# Patient Record
Sex: MALE | Race: WHITE | Employment: UNEMPLOYED | URBAN - METROPOLITAN AREA
[De-identification: names, ages, dates, MRNs, and addresses within clinical notes are randomized per-mention and may not be internally consistent; named-entity substitution may affect disease eponyms.]

---

## 2017-01-16 ENCOUNTER — OFFICE VISIT (OUTPATIENT)
Dept: INTERNAL MEDICINE CLINIC | Age: 27
End: 2017-01-16

## 2017-01-16 VITALS
SYSTOLIC BLOOD PRESSURE: 112 MMHG | HEART RATE: 59 BPM | HEIGHT: 72 IN | WEIGHT: 149.8 LBS | BODY MASS INDEX: 20.29 KG/M2 | OXYGEN SATURATION: 98 % | DIASTOLIC BLOOD PRESSURE: 66 MMHG

## 2017-01-16 DIAGNOSIS — G89.29 CHRONIC RIGHT-SIDED LOW BACK PAIN WITH RIGHT-SIDED SCIATICA: ICD-10-CM

## 2017-01-16 DIAGNOSIS — F17.200 SMOKING ADDICTION: ICD-10-CM

## 2017-01-16 DIAGNOSIS — F79 INTELLECTUAL DISABILITY: ICD-10-CM

## 2017-01-16 DIAGNOSIS — F33.40 RECURRENT MAJOR DEPRESSIVE DISORDER, IN REMISSION (HCC): Primary | Chronic | ICD-10-CM

## 2017-01-16 DIAGNOSIS — M54.41 CHRONIC RIGHT-SIDED LOW BACK PAIN WITH RIGHT-SIDED SCIATICA: ICD-10-CM

## 2017-01-16 DIAGNOSIS — L73.8 FOLLICULITIS BARBAE: ICD-10-CM

## 2017-01-16 PROCEDURE — 99214 OFFICE O/P EST MOD 30 MIN: CPT | Performed by: INTERNAL MEDICINE

## 2017-01-16 RX ORDER — NICOTINE 21 MG/24HR
1 PATCH, TRANSDERMAL 24 HOURS TRANSDERMAL EVERY 24 HOURS
Qty: 30 PATCH | Refills: 3 | Status: SHIPPED | OUTPATIENT
Start: 2017-01-16 | End: 2017-03-16 | Stop reason: DRUGHIGH

## 2017-01-16 ASSESSMENT — ENCOUNTER SYMPTOMS
PHOTOPHOBIA: 0
RESPIRATORY NEGATIVE: 1
GASTROINTESTINAL NEGATIVE: 1
WHEEZING: 0
EYE DISCHARGE: 0
HEMOPTYSIS: 0
HEARTBURN: 0
VOMITING: 0
EYE PAIN: 0
BLURRED VISION: 0
COUGH: 0
SPUTUM PRODUCTION: 0
CONSTIPATION: 0
ABDOMINAL PAIN: 0
NAUSEA: 0
SHORTNESS OF BREATH: 0
SORE THROAT: 0
ORTHOPNEA: 0
BACK PAIN: 0
DOUBLE VISION: 0
EYE REDNESS: 0
STRIDOR: 0
EYES NEGATIVE: 1
DIARRHEA: 0
BLOOD IN STOOL: 0

## 2017-03-16 ENCOUNTER — OFFICE VISIT (OUTPATIENT)
Dept: INTERNAL MEDICINE CLINIC | Age: 27
End: 2017-03-16

## 2017-03-16 VITALS
DIASTOLIC BLOOD PRESSURE: 70 MMHG | OXYGEN SATURATION: 98 % | SYSTOLIC BLOOD PRESSURE: 102 MMHG | BODY MASS INDEX: 20.53 KG/M2 | HEIGHT: 72 IN | WEIGHT: 151.6 LBS | HEART RATE: 72 BPM

## 2017-03-16 DIAGNOSIS — M54.41 CHRONIC RIGHT-SIDED LOW BACK PAIN WITH RIGHT-SIDED SCIATICA: ICD-10-CM

## 2017-03-16 DIAGNOSIS — F17.200 SMOKING ADDICTION: ICD-10-CM

## 2017-03-16 DIAGNOSIS — F79 INTELLECTUAL DISABILITY: ICD-10-CM

## 2017-03-16 DIAGNOSIS — F33.40 RECURRENT MAJOR DEPRESSIVE DISORDER, IN REMISSION (HCC): Primary | Chronic | ICD-10-CM

## 2017-03-16 DIAGNOSIS — L73.8 FOLLICULITIS BARBAE: ICD-10-CM

## 2017-03-16 DIAGNOSIS — M40.50 LORDOSIS: ICD-10-CM

## 2017-03-16 DIAGNOSIS — G89.29 CHRONIC RIGHT-SIDED LOW BACK PAIN WITH RIGHT-SIDED SCIATICA: ICD-10-CM

## 2017-03-16 PROCEDURE — 99214 OFFICE O/P EST MOD 30 MIN: CPT | Performed by: INTERNAL MEDICINE

## 2017-03-16 RX ORDER — NICOTINE 21 MG/24HR
1 PATCH, TRANSDERMAL 24 HOURS TRANSDERMAL EVERY 24 HOURS
Qty: 30 PATCH | Refills: 3 | Status: SHIPPED | OUTPATIENT
Start: 2017-03-16 | End: 2018-09-12 | Stop reason: ALTCHOICE

## 2017-03-16 ASSESSMENT — ENCOUNTER SYMPTOMS
GASTROINTESTINAL NEGATIVE: 1
SHORTNESS OF BREATH: 0
EYE PAIN: 0
NAUSEA: 0
STRIDOR: 0
ABDOMINAL PAIN: 0
PHOTOPHOBIA: 0
BACK PAIN: 0
BLOOD IN STOOL: 0
EYE DISCHARGE: 0
DOUBLE VISION: 0
CONSTIPATION: 0
HEARTBURN: 0
BLURRED VISION: 0
EYES NEGATIVE: 1
EYE REDNESS: 0
HEMOPTYSIS: 0
WHEEZING: 0
RESPIRATORY NEGATIVE: 1
COUGH: 0
SPUTUM PRODUCTION: 0
ORTHOPNEA: 0
DIARRHEA: 0
VOMITING: 0
SORE THROAT: 0

## 2017-06-15 ENCOUNTER — OFFICE VISIT (OUTPATIENT)
Dept: INTERNAL MEDICINE CLINIC | Age: 27
End: 2017-06-15

## 2017-06-15 VITALS
OXYGEN SATURATION: 98 % | HEART RATE: 61 BPM | WEIGHT: 155.4 LBS | SYSTOLIC BLOOD PRESSURE: 102 MMHG | DIASTOLIC BLOOD PRESSURE: 64 MMHG | HEIGHT: 72 IN | BODY MASS INDEX: 21.05 KG/M2

## 2017-06-15 DIAGNOSIS — F79 INTELLECTUAL DISABILITY: ICD-10-CM

## 2017-06-15 DIAGNOSIS — F17.200 SMOKING ADDICTION: ICD-10-CM

## 2017-06-15 DIAGNOSIS — G89.29 CHRONIC RIGHT-SIDED LOW BACK PAIN WITH RIGHT-SIDED SCIATICA: ICD-10-CM

## 2017-06-15 DIAGNOSIS — M54.41 CHRONIC RIGHT-SIDED LOW BACK PAIN WITH RIGHT-SIDED SCIATICA: ICD-10-CM

## 2017-06-15 DIAGNOSIS — F33.40 RECURRENT MAJOR DEPRESSIVE DISORDER, IN REMISSION (HCC): Primary | Chronic | ICD-10-CM

## 2017-06-15 PROCEDURE — 99214 OFFICE O/P EST MOD 30 MIN: CPT | Performed by: INTERNAL MEDICINE

## 2017-06-15 RX ORDER — DICLOFENAC SODIUM 75 MG/1
75 TABLET, DELAYED RELEASE ORAL 2 TIMES DAILY
Qty: 60 TABLET | Refills: 3 | Status: SHIPPED | OUTPATIENT
Start: 2017-06-15 | End: 2018-01-20 | Stop reason: SDUPTHER

## 2017-06-15 RX ORDER — BUSPIRONE HYDROCHLORIDE 5 MG/1
5 TABLET ORAL 3 TIMES DAILY PRN
Qty: 60 TABLET | Refills: 1 | Status: SHIPPED | OUTPATIENT
Start: 2017-06-15 | End: 2017-12-13 | Stop reason: DRUGHIGH

## 2017-06-15 RX ORDER — BUPROPION HYDROCHLORIDE 150 MG/1
150 TABLET ORAL EVERY MORNING
Qty: 30 TABLET | Refills: 3 | Status: SHIPPED | OUTPATIENT
Start: 2017-06-15 | End: 2017-10-16 | Stop reason: SDUPTHER

## 2017-06-15 ASSESSMENT — ENCOUNTER SYMPTOMS
RESPIRATORY NEGATIVE: 1
SHORTNESS OF BREATH: 0
GASTROINTESTINAL NEGATIVE: 1
VOMITING: 0
COUGH: 0
STRIDOR: 0
EYE DISCHARGE: 0
NAUSEA: 0
ORTHOPNEA: 0
HEMOPTYSIS: 0
EYES NEGATIVE: 1
BLURRED VISION: 0
SPUTUM PRODUCTION: 0
ABDOMINAL PAIN: 0
PHOTOPHOBIA: 0
CONSTIPATION: 0
BLOOD IN STOOL: 0
DOUBLE VISION: 0
DIARRHEA: 0
WHEEZING: 0
BACK PAIN: 0
HEARTBURN: 0
EYE REDNESS: 0
EYE PAIN: 0
SORE THROAT: 0

## 2017-09-14 ENCOUNTER — OFFICE VISIT (OUTPATIENT)
Dept: INTERNAL MEDICINE CLINIC | Age: 27
End: 2017-09-14

## 2017-09-14 VITALS
HEIGHT: 72 IN | DIASTOLIC BLOOD PRESSURE: 70 MMHG | SYSTOLIC BLOOD PRESSURE: 110 MMHG | HEART RATE: 72 BPM | OXYGEN SATURATION: 98 % | BODY MASS INDEX: 20.7 KG/M2 | WEIGHT: 152.8 LBS

## 2017-09-14 DIAGNOSIS — M54.41 CHRONIC RIGHT-SIDED LOW BACK PAIN WITH RIGHT-SIDED SCIATICA: ICD-10-CM

## 2017-09-14 DIAGNOSIS — F79 INTELLECTUAL DISABILITY: ICD-10-CM

## 2017-09-14 DIAGNOSIS — L73.8 FOLLICULITIS BARBAE: Primary | ICD-10-CM

## 2017-09-14 DIAGNOSIS — Z23 STREPTOCOCCUS PNEUMONIAE AND INFLUENZA VACCINATION: ICD-10-CM

## 2017-09-14 DIAGNOSIS — F17.200 SMOKING ADDICTION: ICD-10-CM

## 2017-09-14 DIAGNOSIS — F33.40 RECURRENT MAJOR DEPRESSIVE DISORDER, IN REMISSION (HCC): Chronic | ICD-10-CM

## 2017-09-14 DIAGNOSIS — G89.29 CHRONIC RIGHT-SIDED LOW BACK PAIN WITH RIGHT-SIDED SCIATICA: ICD-10-CM

## 2017-09-14 PROCEDURE — 99214 OFFICE O/P EST MOD 30 MIN: CPT | Performed by: INTERNAL MEDICINE

## 2017-09-14 PROCEDURE — 90471 IMMUNIZATION ADMIN: CPT | Performed by: INTERNAL MEDICINE

## 2017-09-14 PROCEDURE — 90732 PPSV23 VACC 2 YRS+ SUBQ/IM: CPT | Performed by: INTERNAL MEDICINE

## 2017-09-14 PROCEDURE — 90686 IIV4 VACC NO PRSV 0.5 ML IM: CPT | Performed by: INTERNAL MEDICINE

## 2017-09-14 PROCEDURE — 90472 IMMUNIZATION ADMIN EACH ADD: CPT | Performed by: INTERNAL MEDICINE

## 2017-09-14 RX ORDER — CLINDAMYCIN AND BENZOYL PEROXIDE 10; 50 MG/G; MG/G
GEL TOPICAL
Qty: 35 G | Refills: 1 | Status: SHIPPED | OUTPATIENT
Start: 2017-09-14 | End: 2018-09-12 | Stop reason: SDUPTHER

## 2017-09-14 ASSESSMENT — ENCOUNTER SYMPTOMS
DOUBLE VISION: 0
SORE THROAT: 0
COUGH: 0
STRIDOR: 0
EYE PAIN: 0
PHOTOPHOBIA: 0
ABDOMINAL PAIN: 0
NAUSEA: 0
HEMOPTYSIS: 0
VOMITING: 0
BLURRED VISION: 0
EYE REDNESS: 0
EYES NEGATIVE: 1
WHEEZING: 0
SHORTNESS OF BREATH: 0
CONSTIPATION: 0
ORTHOPNEA: 0
EYE DISCHARGE: 0
GASTROINTESTINAL NEGATIVE: 1
RESPIRATORY NEGATIVE: 1
BLOOD IN STOOL: 0
SPUTUM PRODUCTION: 0
HEARTBURN: 0
BACK PAIN: 0
DIARRHEA: 0

## 2017-09-14 ASSESSMENT — PATIENT HEALTH QUESTIONNAIRE - PHQ9
2. FEELING DOWN, DEPRESSED OR HOPELESS: 0
1. LITTLE INTEREST OR PLEASURE IN DOING THINGS: 0
SUM OF ALL RESPONSES TO PHQ9 QUESTIONS 1 & 2: 0
SUM OF ALL RESPONSES TO PHQ QUESTIONS 1-9: 0

## 2017-10-16 DIAGNOSIS — F17.200 SMOKING ADDICTION: ICD-10-CM

## 2017-10-16 DIAGNOSIS — F33.40 RECURRENT MAJOR DEPRESSIVE DISORDER, IN REMISSION (HCC): Chronic | ICD-10-CM

## 2017-10-16 RX ORDER — BUPROPION HYDROCHLORIDE 150 MG/1
TABLET ORAL
Qty: 30 TABLET | Refills: 2 | Status: SHIPPED | OUTPATIENT
Start: 2017-10-16 | End: 2018-01-21 | Stop reason: SDUPTHER

## 2017-12-13 ENCOUNTER — OFFICE VISIT (OUTPATIENT)
Dept: INTERNAL MEDICINE CLINIC | Age: 27
End: 2017-12-13

## 2017-12-13 VITALS
TEMPERATURE: 97.7 F | HEIGHT: 72 IN | OXYGEN SATURATION: 98 % | SYSTOLIC BLOOD PRESSURE: 130 MMHG | WEIGHT: 154.4 LBS | DIASTOLIC BLOOD PRESSURE: 80 MMHG | HEART RATE: 85 BPM | RESPIRATION RATE: 16 BRPM | BODY MASS INDEX: 20.91 KG/M2

## 2017-12-13 DIAGNOSIS — L73.8 FOLLICULITIS BARBAE: ICD-10-CM

## 2017-12-13 DIAGNOSIS — F17.200 SMOKING ADDICTION: ICD-10-CM

## 2017-12-13 DIAGNOSIS — F51.01 PRIMARY INSOMNIA: Primary | ICD-10-CM

## 2017-12-13 DIAGNOSIS — F33.40 RECURRENT MAJOR DEPRESSIVE DISORDER, IN REMISSION (HCC): Chronic | ICD-10-CM

## 2017-12-13 PROCEDURE — 99214 OFFICE O/P EST MOD 30 MIN: CPT | Performed by: INTERNAL MEDICINE

## 2017-12-13 RX ORDER — BUSPIRONE HYDROCHLORIDE 5 MG/1
5-15 TABLET ORAL 3 TIMES DAILY PRN
Qty: 60 TABLET | Refills: 1 | Status: SHIPPED
Start: 2017-12-13 | End: 2018-03-14 | Stop reason: SDUPTHER

## 2017-12-13 RX ORDER — LANOLIN ALCOHOL/MO/W.PET/CERES
3-9 CREAM (GRAM) TOPICAL NIGHTLY PRN
Qty: 30 TABLET | Refills: 3 | Status: SHIPPED | OUTPATIENT
Start: 2017-12-13

## 2017-12-13 RX ORDER — DOXYCYCLINE HYCLATE 100 MG/1
100 CAPSULE ORAL 2 TIMES DAILY
Qty: 14 CAPSULE | Refills: 0 | Status: SHIPPED | OUTPATIENT
Start: 2017-12-13 | End: 2017-12-20

## 2017-12-13 ASSESSMENT — ENCOUNTER SYMPTOMS
GASTROINTESTINAL NEGATIVE: 1
ORTHOPNEA: 0
DIARRHEA: 0
VOMITING: 0
EYES NEGATIVE: 1
SPUTUM PRODUCTION: 0
HEMOPTYSIS: 0
NAUSEA: 0
COUGH: 0
STRIDOR: 0
DOUBLE VISION: 0
BLOOD IN STOOL: 0
CONSTIPATION: 0
SHORTNESS OF BREATH: 0
RESPIRATORY NEGATIVE: 1
EYE REDNESS: 0
EYE DISCHARGE: 0
EYE PAIN: 0
PHOTOPHOBIA: 0
BLURRED VISION: 0
SORE THROAT: 0
BACK PAIN: 0
HEARTBURN: 0
ABDOMINAL PAIN: 0
WHEEZING: 0

## 2017-12-13 NOTE — PATIENT INSTRUCTIONS
Look into the settings of your tv to see if it has EYE SAVER mode to use at night. Patient Education          melatonin  Pronunciation:  jackie christie TOE joe  Brand:  Dual Spectrum Melatonin, Melatonin, Melatonin Time Release, Nature's Bounty Dual Spectrum Melatonin  What is the most important information I should know about melatonin? Follow all directions on the product label and package. Tell each of your healthcare providers about all your medical conditions, allergies, and all medicines you use. What is melatonin? Melatonin is a manmade form of a hormone produced in the brain that helps regulate your sleep and wake cycle. Melatonin has been used in alternative medicine as a likely effective aid in treating insomnia (trouble falling asleep or staying asleep). Melatonin is also likely effective in treating sleep disorders in people who are blind. Melatonin is also possibly effective in treating jet lag, high blood pressure, tumors, low blood platelets (blood cells that help your blood to clot), insomnia caused by withdrawal from drug addiction, or anxiety caused by surgery. A topical form of melatonin applied to the skin is possibly effective in preventing sunburn. Melatonin has also been used to treat infertility, to improve sleep problems caused by shift work, or to enhance athletic performance. However, research has shown that melatonin may not be effective in treating these conditions. Other uses not proven with research have included treating depression, bipolar disorder, dementia, macular degeneration, attention deficit hyperactivity disorder, enlarged prostate, chronic fatigue syndrome, fibromyalgia, restless leg syndrome, stomach ulcers, irritable bowel syndrome, nicotine withdrawal, and many other conditions. It is not certain whether melatonin is effective in treating any medical condition. Medicinal use of this product has not been approved by the FDA.  Melatonin should not be used in place of product. Take melatonin at bedtime, or when you are getting ready for sleep. If you use this product to treat jet lag, take the melatonin at bedtime on the day you arrive at your destination and keep using this product for 2 to 5 days. If you take this product to treat other conditions not related to sleep, follow your healthcare provider's instructions about when and how to take melatonin. To take the orally disintegrating tablet:  · Keep the tablet in its blister pack until you are ready to take it. Open the package and peel back the foil. Do not push a tablet through the foil or you may damage the tablet. · Use dry hands to remove the tablet and place it in your mouth. · Do not swallow the tablet whole. Allow it to dissolve in your mouth without chewing. If desired, you may drink liquid to help swallow the dissolved tablet. Call your doctor if the condition you are treating with melatonin does not improve, or if it gets worse while using this product. Store at room temperature away from moisture and heat. What happens if I miss a dose? Since melatonin is used when needed, you may not be on a dosing schedule. If you are on a schedule, use the missed dose as soon as you remember. Skip the missed dose if it is almost time for your next scheduled dose. Do not use extra melatonin to make up the missed dose. What happens if I overdose? Seek emergency medical attention or call the Poison Help line at 1-185.286.6181. What should I avoid while taking melatonin? Melatonin may impair your thinking or reactions. Avoid driving or operating machinery for a least 4 hours after taking melatonin. This product may also affect your sleep-wake cycle for several days if you are traveling through many different time zones. Avoid using melatonin with other herbal/health supplements. Melatonin and many other herbal products can increase your risk of bleeding, seizures, or low blood pressure.  Using certain products together can increase these risks. Avoid coffee, tea, cola, energy drinks, or other products that contain caffeine. What are the possible side effects of melatonin? Get emergency medical help if you have any of these signs of an allergic reaction: hives; difficult breathing; swelling of your face, lips, tongue, or throat. Although not all side effects are known, melatonin is thought to be possibly safe when taken for a short period of time (up to 2 years in some people). Common side effects may include:  · daytime drowsiness;  · depressed mood, feeling irritable;  · stomach pain;  · headache; or  · dizziness. This is not a complete list of side effects and others may occur. Call your doctor for medical advice about side effects. You may report side effects to FDA at 5-688-FDA-0776. What other drugs will affect melatonin? Taking this product with other drugs that make you sleepy can worsen this effect. Ask your doctor before taking melatonin with a sleeping pill, antidepressant, sedative, narcotic pain medicine, muscle relaxer, seizure medicine, or herbal/health supplements may also cause drowsiness (tryptophan, [place] poppy, chamomile, gotu kyle, kava, Chencho's wort, skullcap, valerian, and others).   Do not take melatonin without medical advice if you are using any of the following medications:  · an antibiotic;  · aspirin or acetaminophen (Tylenol);  · birth control pills;  · insulin or oral diabetes medicine;  · narcotic pain medicine;  · stomach medicine --lansoprazole (Prevacid), omeprazole (Prilosec), ondansetron (Zofran);  · ADHD medication- -methylphenidate, Adderall, Ritalin, and others;  · heart or blood pressure medicine --mexiletine, propranolol, verapamil;  · medicine to treat or prevent blood clots --clopidogrel (Plavix), warfarin (Coumadin, Jantoven);  · NSAIDs (nonsteroidal anti-inflammatory drugs) --ibuprofen (Advil, Motrin), naproxen (Aleve), celecoxib, diclofenac, indomethacin, meloxicam, and others; or  · steroid medicine --prednisone, and others. This list is not complete. Other drugs may interact with melatonin, including prescription and over-the-counter medicines, vitamins, and herbal products. Not all possible interactions are listed in this product guide. Where can I get more information? Your doctor, pharmacist, or health care provider may have more information about melatonin. Remember, keep this and all other medicines out of the reach of children, never share your medicines with others, and use this medication only for the indication prescribed. Every effort has been made to ensure that the information provided by [address] is accurate, up-to-date, and complete, but no guarantee is made to that effect. Drug information contained herein may be time sensitive. [facility] information has been compiled for use by healthcare practitioners and consumers in the [place] and therefore [employer] does not warrant that uses outside of the [place] are appropriate, unless specifically indicated otherwise. [facility]'s drug information does not endorse drugs, diagnose patients or recommend therapy. [facility] drug information is an informational resource designed to assist licensed healthcare practitioners in caring for their patients and/or to serve consumers viewing this service as a supplement to, and not a substitute for, the expertise, skill, knowledge and judgment of healthcare practitioners. The absence of a warning for a given drug or drug combination in no way should be construed to indicate that the drug or drug combination is safe, effective or appropriate for any given patient. [facility] does not assume any responsibility for any aspect of healthcare administered with the aid of information [facility] provides.  The information contained herein is not intended to cover all possible uses, directions, precautions, warnings, drug interactions, allergic reactions, or adverse

## 2017-12-13 NOTE — PROGRESS NOTES
blood in stool, constipation, diarrhea, heartburn, melena, nausea and vomiting. Genitourinary: Negative. Negative for dysuria, flank pain, frequency, hematuria and urgency. Musculoskeletal: Negative. Negative for back pain, falls, joint pain, myalgias and neck pain. Skin: Negative. Negative for itching and rash. Neurological: Positive for dizziness. Negative for tingling, tremors, sensory change, speech change, focal weakness, seizures, loss of consciousness, weakness and headaches. Endo/Heme/Allergies: Negative. Psychiatric/Behavioral: Negative. Negative for depression, hallucinations, memory loss, substance abuse and suicidal ideas. The patient is not nervous/anxious and does not have insomnia. All other systems reviewed and are negative. Objective: Wt Readings from Last 3 Encounters:   12/13/17 154 lb 6.4 oz (70 kg)   09/14/17 152 lb 12.8 oz (69.3 kg)   06/15/17 155 lb 6.4 oz (70.5 kg)     BP Readings from Last 3 Encounters:   12/13/17 130/80   09/14/17 110/70   06/15/17 102/64      Vitals:    12/13/17 1541   BP: 130/80   Site: Left Arm   Position: Sitting   Cuff Size: Large Adult   Pulse: 85   Resp: 16   Temp: 97.7 °F (36.5 °C)   TempSrc: Oral   SpO2: 98%   Weight: 154 lb 6.4 oz (70 kg)   Height: 6' (1.829 m)     Body mass index is 20.94 kg/m². Physical Exam   Constitutional: He is oriented to person, place, and time and well-developed, well-nourished, and in no distress. Vital signs are normal. He appears to not be writhing in pain, not malnourished, not dehydrated and not jaundiced. He appears healthy. He appears not cachectic. Non-toxic appearance. He does not have a sickly appearance. No distress. HENT:   Head: Normocephalic and atraumatic. Right Ear: External ear normal.   Left Ear: External ear normal.   Nose: Nose normal.   Mouth/Throat: Oropharynx is clear and moist. No oropharyngeal exudate.    Eyes: Conjunctivae and EOM are normal. Pupils are equal, round, and depressive disorder, in remission ([facility] [address]  Cont wellbutrin and get prn buspar for anxiety    4. Smoking addiction  Stopped smoking! Additional Orders:      No orders of the defined types were placed in this encounter. Orders Placed This Encounter   Medications    doxycycline hyclate (VIBRAMYCIN) 100 MG capsule     Sig: Take 1 capsule by mouth 2 times daily for 7 days For folliculitis     Dispense:  14 capsule     Refill:  0    melatonin (RA MELATONIN) 3 MG TABS tablet     Sig: Take 1-3 tablets by mouth nightly as needed (SLEEP 45 minutes before bed)     Dispense:  30 tablet     Refill:  3    busPIRone (BUSPAR) 5 MG tablet     Sig: Take 1-3 tablets by mouth 3 times daily as needed (Anxiety)     Dispense:  60 tablet     Refill:  1     DISPOSITION:      Return in about 3 months (around 3/13/2018).   1. Greater than 25 minutes spent with patient and mother and >20 minutes on medication dosing, use and lifestyle modifications, home safety    Ravinder Gonzalez MD

## 2018-01-20 DIAGNOSIS — M54.41 CHRONIC RIGHT-SIDED LOW BACK PAIN WITH RIGHT-SIDED SCIATICA: ICD-10-CM

## 2018-01-20 DIAGNOSIS — G89.29 CHRONIC RIGHT-SIDED LOW BACK PAIN WITH RIGHT-SIDED SCIATICA: ICD-10-CM

## 2018-01-21 DIAGNOSIS — F17.200 SMOKING ADDICTION: ICD-10-CM

## 2018-01-21 DIAGNOSIS — F33.40 RECURRENT MAJOR DEPRESSIVE DISORDER, IN REMISSION (HCC): Chronic | ICD-10-CM

## 2018-01-22 RX ORDER — BUPROPION HYDROCHLORIDE 150 MG/1
TABLET ORAL
Qty: 30 TABLET | Refills: 1 | Status: SHIPPED | OUTPATIENT
Start: 2018-01-22 | End: 2018-03-23 | Stop reason: SDUPTHER

## 2018-01-22 RX ORDER — DICLOFENAC SODIUM 75 MG/1
TABLET, DELAYED RELEASE ORAL
Qty: 60 TABLET | Refills: 2 | Status: SHIPPED | OUTPATIENT
Start: 2018-01-22 | End: 2018-09-12 | Stop reason: SDUPTHER

## 2018-03-14 ENCOUNTER — OFFICE VISIT (OUTPATIENT)
Dept: INTERNAL MEDICINE CLINIC | Age: 28
End: 2018-03-14

## 2018-03-14 VITALS
BODY MASS INDEX: 20.59 KG/M2 | HEART RATE: 76 BPM | RESPIRATION RATE: 16 BRPM | DIASTOLIC BLOOD PRESSURE: 78 MMHG | HEIGHT: 72 IN | SYSTOLIC BLOOD PRESSURE: 120 MMHG | WEIGHT: 152 LBS | TEMPERATURE: 98.6 F

## 2018-03-14 DIAGNOSIS — F79 INTELLECTUAL DISABILITY: ICD-10-CM

## 2018-03-14 DIAGNOSIS — F33.40 RECURRENT MAJOR DEPRESSIVE DISORDER, IN REMISSION (HCC): Chronic | ICD-10-CM

## 2018-03-14 DIAGNOSIS — L73.8 FOLLICULITIS BARBAE: ICD-10-CM

## 2018-03-14 DIAGNOSIS — M54.2 MUSCULOSKELETAL NECK PAIN: Primary | ICD-10-CM

## 2018-03-14 DIAGNOSIS — F17.211 CIGARETTE NICOTINE DEPENDENCE IN REMISSION: Chronic | ICD-10-CM

## 2018-03-14 PROCEDURE — 99214 OFFICE O/P EST MOD 30 MIN: CPT | Performed by: INTERNAL MEDICINE

## 2018-03-14 RX ORDER — HYDROXYZINE HYDROCHLORIDE 25 MG/1
25 TABLET, FILM COATED ORAL 3 TIMES DAILY PRN
Qty: 30 TABLET | Refills: 1 | Status: SHIPPED | OUTPATIENT
Start: 2018-03-14 | End: 2018-03-24

## 2018-03-14 RX ORDER — BUSPIRONE HYDROCHLORIDE 10 MG/1
10 TABLET ORAL 3 TIMES DAILY PRN
Qty: 90 TABLET | Refills: 3 | Status: SHIPPED | OUTPATIENT
Start: 2018-03-14 | End: 2018-09-12 | Stop reason: ALTCHOICE

## 2018-03-14 ASSESSMENT — ENCOUNTER SYMPTOMS
SPUTUM PRODUCTION: 0
ABDOMINAL PAIN: 0
ORTHOPNEA: 0
BLOOD IN STOOL: 0
PHOTOPHOBIA: 0
CONSTIPATION: 0
EYE PAIN: 0
EYES NEGATIVE: 1
DOUBLE VISION: 0
BACK PAIN: 0
COUGH: 0
RESPIRATORY NEGATIVE: 1
NAUSEA: 0
GASTROINTESTINAL NEGATIVE: 1
VOMITING: 0
BLURRED VISION: 0
SORE THROAT: 0
SHORTNESS OF BREATH: 0
EYE DISCHARGE: 0
DIARRHEA: 0
HEMOPTYSIS: 0
EYE REDNESS: 0
HEARTBURN: 0
WHEEZING: 0
STRIDOR: 0

## 2018-03-14 NOTE — PROGRESS NOTES
OUTPATIENT PROGRESS NOTE    Date of Service:  3/14/2018  Address: [address] [place] [address] INTERNAL MEDICINE  [address] [address]  Dept: [phone]    Subjective:      Patient ID: [id number]  Lord Rosa is a 32 y.o. male with:  Chief Complaint   Patient presents with    3 Month Follow-Up     Pt is here for a 3 month follow up. Pt states that he is doing well and has no concenrs at this time. HPI: Comes in for follow up care today for history of learning disability and anxiety/depression as well as smoking, folliculitis barbae, and new right sided neck pain. Has right sided neck pain for the past month. He notes nothing worsens it or improves it. Sleeps on 2 pillows at night, has not changed his bed or sleeping position.      Has previously been treated with Cymbalta for his anxiety/depression but stopped taking it. He discontinued after 1 month due to lightheadedness and dizziness.   Had been taking his wellbutrin with good effects on his mood, though he does miss some doses. He has been exhibiting some compulsive behaviors and his mother is concerned about this as well as his social isolation and loneliness. He no longer feels dizzy. Occasionally feels lightheaded. He and his mother state things have been going well. Has completely stopped smoking cigarettes and vaping.      Has great improvement with voltaren for his pain.     His folliculitis has gotten better.     Has been staying up late at night, sometimes until beyond 4:30am.    Review of Systems   Constitutional: Negative. Negative for chills, diaphoresis, fever, malaise/fatigue and weight loss. HENT: Negative for congestion, ear discharge, ear pain, hearing loss, nosebleeds, sore throat and tinnitus. Eyes: Negative. Negative for blurred vision, double vision, photophobia, pain, discharge and redness. Respiratory: Negative.   Negative for cough, hemoptysis, sputum production, shortness of breath, wheezing and stridor. Cardiovascular: Negative. Negative for chest pain, palpitations, orthopnea, claudication, leg swelling and PND. Gastrointestinal: Negative. Negative for abdominal pain, blood in stool, constipation, diarrhea, heartburn, melena, nausea and vomiting. Genitourinary: Negative. Negative for dysuria, flank pain, frequency, hematuria and urgency. Musculoskeletal: Negative. Negative for back pain, falls, joint pain, myalgias and neck pain. Skin: Negative. Negative for itching and rash. Neurological: Positive for dizziness. Negative for tingling, tremors, sensory change, speech change, focal weakness, seizures, loss of consciousness, weakness and headaches. Endo/Heme/Allergies: Negative. Psychiatric/Behavioral: Negative. Negative for depression, hallucinations, memory loss, substance abuse and suicidal ideas. The patient is not nervous/anxious and does not have insomnia. All other systems reviewed and are negative. Objective: Wt Readings from Last 3 Encounters:   03/14/18 152 lb (68.9 kg)   12/13/17 154 lb 6.4 oz (70 kg)   09/14/17 152 lb 12.8 oz (69.3 kg)     BP Readings from Last 3 Encounters:   03/14/18 120/78   12/13/17 130/80   09/14/17 110/70      Vitals:    03/14/18 1527   BP: 120/78   Site: Right Arm   Position: Sitting   Cuff Size: Medium Adult   Pulse: 76   Resp: 16   Temp: 98.6 °F (37 °C)   TempSrc: Oral   Weight: 152 lb (68.9 kg)   Height: 6' (1.829 m)     Body mass index is 20.61 kg/m². Physical Exam   Constitutional: He is oriented to person, place, and time and well-developed, well-nourished, and in no distress. Vital signs are normal. He appears to not be writhing in pain, not malnourished, not dehydrated and not jaundiced. He appears healthy. He appears not cachectic. Non-toxic appearance. He does not have a sickly appearance. No distress. HENT:   Head: Normocephalic and atraumatic.    Right Ear: External ear normal.   Left Ear: External ear normal.   Nose: Nose normal.   Mouth/Throat: Oropharynx is clear and moist. No oropharyngeal exudate. Eyes: Conjunctivae and EOM are normal. Pupils are equal, round, and reactive to light. Right eye exhibits no discharge. Left eye exhibits no discharge. No scleral icterus. Neck: Normal range of motion. Neck supple. No JVD present. No tracheal deviation present. No thyromegaly present. Cardiovascular: Normal rate, regular rhythm, normal heart sounds and intact distal pulses. Exam reveals no gallop and no friction rub. No murmur heard. Pulmonary/Chest: Effort normal and breath sounds normal. No stridor. No respiratory distress. He has no wheezes. He has no rales. He exhibits no tenderness. Abdominal: Soft. Bowel sounds are normal. He exhibits no distension and no mass. There is no tenderness. There is no rebound and no guarding. Musculoskeletal: Normal range of motion. He exhibits no edema. Lumbar back: He exhibits tenderness and pain. Lymphadenopathy:     He has no cervical adenopathy. Neurological: He is alert and oriented to person, place, and time. He has normal reflexes. No cranial nerve deficit. He exhibits normal muscle tone. Gait normal. Coordination normal. GCS score is 15. Skin: Skin is warm and dry. No rash noted. He is not diaphoretic. No erythema. No pallor. Psychiatric: Mood, memory, affect and judgment normal.   Nursing note and vitals reviewed. Assessment/Plan:      Encounter Diagnoses   Name Primary?  Musculoskeletal neck pain Yes    Recurrent major depressive disorder, in remission (HCC)     Cigarette nicotine dependence in remission     Intellectual disability     Folliculitis barbae        1. Musculoskeletal neck pain  Will give exercises and can cont voltaren    2. Recurrent major depressive disorder, in remission ([facility] [address]  Will increase buspar for his anxiety, add atarax prn. Will try to put off benzos as a last resort.  May have some compulsive behavior, could benefit from Luvox possibly in the future. See psychology, may need psychiatry in the near future. - busPIRone (BUSPAR) 10 MG tablet; Take 1 tablet by mouth 3 times daily as needed (Anxiety or take prior to situations that cause anxiety)  Dispense: 90 tablet; Refill: 3  - hydrOXYzine (ATARAX) 25 MG tablet; Take 1 tablet by mouth 3 times daily as needed for Itching or Anxiety  Dispense: 30 tablet; Refill: 1    3. Cigarette nicotine dependence in remission  Doing great    4. Intellectual disability  Still living with his parents, safe at home    5. Folliculitis barbae  Improved significantly       Additional Orders:      No orders of the defined types were placed in this encounter. Orders Placed This Encounter   Medications    busPIRone (BUSPAR) 10 MG tablet     Sig: Take 1 tablet by mouth 3 times daily as needed (Anxiety or take prior to situations that cause anxiety)     Dispense:  90 tablet     Refill:  3    hydrOXYzine (ATARAX) 25 MG tablet     Sig: Take 1 tablet by mouth 3 times daily as needed for Itching or Anxiety     Dispense:  30 tablet     Refill:  1       DISPOSITION:      Return in about 3 months (around 6/14/2018).   1. Greater than 45 minutes spent with patient and significant other and >20 minutes on medication dosing, use and lifestyle modifications, home safety    Latia Ramey MD

## 2018-03-14 NOTE — PATIENT INSTRUCTIONS
your head to the left, and hold for 15 to 30 seconds. 4. Repeat 2 to 4 times to each side. Chin tuck    1. Lie on the floor with a rolled-up towel under your neck. Your head should be touching the floor. 2. Slowly bring your chin toward the front of your neck. 3. Hold for a count of 6, and then relax for up to 10 seconds. 4. Repeat 8 to 12 times. Forward neck flexion    1. Sit in a firm chair, or stand up straight. 2. Bend your head forward. 3. Hold for 15 to 30 seconds, then return to your starting position. 4. Repeat 2 to 4 times. Follow-up care is a key part of your treatment and safety. Be sure to make and go to all appointments, and call your doctor if you are having problems. It's also a good idea to know your test results and keep a list of the medicines you take. Where can you learn more? Go to [email or web address]://[email or web address]. org and sign in to your [employer] account. Enter P962 in the [employer] box to learn more about \"Neck Spasm: Exercises. \"     If you do not have an account, please click on the \"Sign Up Now\" link. Current as of: March 21, 2017  Content Version: 11.5  © 9418-1137 Healthwise, Incorporated. Care instructions adapted under license by [facility] ([facility]). If you have questions about a medical condition or this instruction, always ask your healthcare professional. [place] [address] any warranty or liability for your use of this information.

## 2018-03-23 DIAGNOSIS — F17.200 SMOKING ADDICTION: ICD-10-CM

## 2018-03-23 DIAGNOSIS — F33.40 RECURRENT MAJOR DEPRESSIVE DISORDER, IN REMISSION (HCC): Chronic | ICD-10-CM

## 2018-03-24 RX ORDER — BUPROPION HYDROCHLORIDE 150 MG/1
TABLET ORAL
Qty: 30 TABLET | Refills: 0 | Status: SHIPPED | OUTPATIENT
Start: 2018-03-24 | End: 2018-04-21 | Stop reason: SDUPTHER

## 2018-04-21 DIAGNOSIS — F17.200 SMOKING ADDICTION: ICD-10-CM

## 2018-04-21 DIAGNOSIS — F33.40 RECURRENT MAJOR DEPRESSIVE DISORDER, IN REMISSION (HCC): Chronic | ICD-10-CM

## 2018-04-23 RX ORDER — BUPROPION HYDROCHLORIDE 150 MG/1
TABLET ORAL
Qty: 30 TABLET | Refills: 0 | Status: SHIPPED | OUTPATIENT
Start: 2018-04-23 | End: 2018-05-29 | Stop reason: SDUPTHER

## 2018-05-29 DIAGNOSIS — F33.40 RECURRENT MAJOR DEPRESSIVE DISORDER, IN REMISSION (HCC): Chronic | ICD-10-CM

## 2018-05-29 DIAGNOSIS — F17.200 SMOKING ADDICTION: ICD-10-CM

## 2018-05-29 RX ORDER — BUPROPION HYDROCHLORIDE 150 MG/1
TABLET ORAL
Qty: 30 TABLET | Refills: 0 | Status: SHIPPED | OUTPATIENT
Start: 2018-05-29 | End: 2018-09-12 | Stop reason: ALTCHOICE

## 2018-06-11 ENCOUNTER — OFFICE VISIT (OUTPATIENT)
Dept: INTERNAL MEDICINE CLINIC | Age: 28
End: 2018-06-11

## 2018-06-11 VITALS
TEMPERATURE: 97.6 F | DIASTOLIC BLOOD PRESSURE: 74 MMHG | OXYGEN SATURATION: 99 % | BODY MASS INDEX: 21.26 KG/M2 | HEART RATE: 69 BPM | HEIGHT: 72 IN | SYSTOLIC BLOOD PRESSURE: 118 MMHG | RESPIRATION RATE: 16 BRPM | WEIGHT: 157 LBS

## 2018-06-11 DIAGNOSIS — F17.211 CIGARETTE NICOTINE DEPENDENCE IN REMISSION: Chronic | ICD-10-CM

## 2018-06-11 DIAGNOSIS — L73.8 FOLLICULITIS BARBAE: ICD-10-CM

## 2018-06-11 DIAGNOSIS — R44.3 HALLUCINATIONS: ICD-10-CM

## 2018-06-11 DIAGNOSIS — F33.40 RECURRENT MAJOR DEPRESSIVE DISORDER, IN REMISSION (HCC): Primary | Chronic | ICD-10-CM

## 2018-06-11 PROCEDURE — 99214 OFFICE O/P EST MOD 30 MIN: CPT | Performed by: INTERNAL MEDICINE

## 2018-06-11 ASSESSMENT — ENCOUNTER SYMPTOMS
PHOTOPHOBIA: 0
EYE DISCHARGE: 0
EYE REDNESS: 0
HEARTBURN: 0
SHORTNESS OF BREATH: 0
SPUTUM PRODUCTION: 0
DOUBLE VISION: 0
NAUSEA: 0
BACK PAIN: 0
ORTHOPNEA: 0
VOMITING: 0
DIARRHEA: 0
HEMOPTYSIS: 0
CONSTIPATION: 0
BLURRED VISION: 0
EYES NEGATIVE: 1
STRIDOR: 0
WHEEZING: 0
BLOOD IN STOOL: 0
RESPIRATORY NEGATIVE: 1
GASTROINTESTINAL NEGATIVE: 1
ABDOMINAL PAIN: 0
SORE THROAT: 0
EYE PAIN: 0
COUGH: 0

## 2018-09-12 ENCOUNTER — OFFICE VISIT (OUTPATIENT)
Dept: INTERNAL MEDICINE CLINIC | Age: 28
End: 2018-09-12

## 2018-09-12 VITALS
SYSTOLIC BLOOD PRESSURE: 104 MMHG | DIASTOLIC BLOOD PRESSURE: 70 MMHG | HEART RATE: 68 BPM | WEIGHT: 155 LBS | OXYGEN SATURATION: 98 % | HEIGHT: 71 IN | BODY MASS INDEX: 21.7 KG/M2 | TEMPERATURE: 97.9 F

## 2018-09-12 DIAGNOSIS — L73.8 FOLLICULITIS BARBAE: Primary | ICD-10-CM

## 2018-09-12 DIAGNOSIS — G89.29 CHRONIC RIGHT-SIDED LOW BACK PAIN WITH RIGHT-SIDED SCIATICA: ICD-10-CM

## 2018-09-12 DIAGNOSIS — F17.211 CIGARETTE NICOTINE DEPENDENCE IN REMISSION: Chronic | ICD-10-CM

## 2018-09-12 DIAGNOSIS — E78.2 MIXED HYPERLIPIDEMIA: ICD-10-CM

## 2018-09-12 DIAGNOSIS — M54.41 CHRONIC RIGHT-SIDED LOW BACK PAIN WITH RIGHT-SIDED SCIATICA: ICD-10-CM

## 2018-09-12 PROCEDURE — 99214 OFFICE O/P EST MOD 30 MIN: CPT | Performed by: INTERNAL MEDICINE

## 2018-09-12 RX ORDER — CLINDAMYCIN AND BENZOYL PEROXIDE 10; 50 MG/G; MG/G
GEL TOPICAL
Qty: 35 G | Refills: 1 | Status: SHIPPED | OUTPATIENT
Start: 2018-09-12 | End: 2019-03-13 | Stop reason: SDUPTHER

## 2018-09-12 RX ORDER — DICLOFENAC SODIUM 75 MG/1
TABLET, DELAYED RELEASE ORAL
Qty: 60 TABLET | Refills: 2 | Status: SHIPPED | OUTPATIENT
Start: 2018-09-12 | End: 2019-03-13 | Stop reason: SDUPTHER

## 2018-09-12 ASSESSMENT — ENCOUNTER SYMPTOMS
VOMITING: 0
NAUSEA: 0
ABDOMINAL PAIN: 0
DIARRHEA: 0
TROUBLE SWALLOWING: 0
SORE THROAT: 0
WHEEZING: 0
SHORTNESS OF BREATH: 0

## 2018-09-12 NOTE — PROGRESS NOTES
Department of Internal Medicine  Clinic Note    Date: 9/12/2018                                               Subjective/Objective:     Chief Complaint   Patient presents with    Established New Doctor     HPI: Pt presents today for f/u evaluation of nicotine dependence (currently not smoking, was previously on Wellbutrin, now off), anxiety (given Buspar but not taking), superficial scalp lesions. Pt expresses some concerns for sleep apnea. During our discussion the pt expresses poor sleep hygiene. We discuss different ways to help improve sleep quality. 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 His smoking use included Cigarettes. He has a 14.00 pack-year smoking history. He has never used smokeless tobacco.     ETOH:   reports that he does not drink alcohol. Past Medical History:   Diagnosis Date    Intellectual disability 12/5/2016    Smoking addiction 12/5/2016       No past surgical history on file.     Office Visit on 12/05/2016   Component Date Value Ref Range Status    WBC 12/05/2016 9.1  4.0 - 11.0 K/uL Final    RBC 12/05/2016 5.42  4.20 - 5.90 M/uL Final    Hemoglobin 12/05/2016 16.0  13.5 - 17.5 g/dL Final    Hematocrit 12/05/2016 47.5  40.5 - 52.5 % Final    MCV 12/05/2016 87.6  80.0 - 100.0 fL Final    MCH 12/05/2016 29.4  26.0 - 34.0 pg Final    MCHC 12/05/2016 33.6  31.0 - 36.0 g/dL Final    RDW 12/05/2016 13.3  12.4 - 15.4 % Final    Platelets 49/01/4026 201  135 - 450 K/uL Final    MPV 12/05/2016 8.3  5.0 - 10.5 fL Final    Neutrophils % 12/05/2016 56.2  % Final    Lymphocytes %

## 2019-10-09 PROBLEM — H92.01 OTALGIA OF RIGHT EAR: Status: ACTIVE | Noted: 2019-10-09
